# Patient Record
Sex: FEMALE | Race: OTHER | HISPANIC OR LATINO | URBAN - METROPOLITAN AREA
[De-identification: names, ages, dates, MRNs, and addresses within clinical notes are randomized per-mention and may not be internally consistent; named-entity substitution may affect disease eponyms.]

---

## 2017-11-09 ENCOUNTER — APPOINTMENT (RX ONLY)
Dept: URBAN - METROPOLITAN AREA CLINIC 169 | Facility: CLINIC | Age: 42
Setting detail: DERMATOLOGY
End: 2017-11-09

## 2017-11-09 DIAGNOSIS — L71.8 OTHER ROSACEA: ICD-10-CM

## 2017-11-09 PROCEDURE — 99212 OFFICE O/P EST SF 10 MIN: CPT

## 2017-11-09 PROCEDURE — ? COUNSELING

## 2017-11-09 PROCEDURE — ? PRESCRIPTION

## 2017-11-09 RX ORDER — ADAPALENE 3 MG/G
GEL TOPICAL
Qty: 1 | Refills: 10 | Status: ERX | COMMUNITY
Start: 2017-11-09

## 2017-11-09 RX ADMIN — ADAPALENE 1: 3 GEL TOPICAL at 00:00

## 2017-11-09 ASSESSMENT — LOCATION DETAILED DESCRIPTION DERM
LOCATION DETAILED: LEFT MEDIAL MALAR CHEEK
LOCATION DETAILED: LEFT INFERIOR CENTRAL MALAR CHEEK
LOCATION DETAILED: LEFT CENTRAL MALAR CHEEK
LOCATION DETAILED: RIGHT MEDIAL FOREHEAD
LOCATION DETAILED: RIGHT INFERIOR CENTRAL MALAR CHEEK

## 2017-11-09 ASSESSMENT — LOCATION SIMPLE DESCRIPTION DERM
LOCATION SIMPLE: RIGHT FOREHEAD
LOCATION SIMPLE: RIGHT CHEEK
LOCATION SIMPLE: LEFT CHEEK

## 2017-11-09 ASSESSMENT — LOCATION ZONE DERM: LOCATION ZONE: FACE

## 2022-01-11 ENCOUNTER — OFFICE VISIT (OUTPATIENT)
Dept: URBAN - METROPOLITAN AREA CLINIC 10 | Facility: CLINIC | Age: 47
End: 2022-01-11
Payer: COMMERCIAL

## 2022-01-11 DIAGNOSIS — H20.9 UVEITIS: Primary | ICD-10-CM

## 2022-01-11 DIAGNOSIS — H16.223 KERATOCONJUNCTIVITIS SICCA, BILATERAL: ICD-10-CM

## 2022-01-11 PROCEDURE — 92134 CPTRZ OPH DX IMG PST SGM RTA: CPT | Performed by: STUDENT IN AN ORGANIZED HEALTH CARE EDUCATION/TRAINING PROGRAM

## 2022-01-11 PROCEDURE — 99204 OFFICE O/P NEW MOD 45 MIN: CPT | Performed by: STUDENT IN AN ORGANIZED HEALTH CARE EDUCATION/TRAINING PROGRAM

## 2022-01-11 RX ORDER — PREDNISOLONE ACETATE 10 MG/ML
1 % SUSPENSION/ DROPS OPHTHALMIC
Qty: 10 | Refills: 1 | Status: INACTIVE
Start: 2022-01-11 | End: 2022-02-22

## 2022-01-11 ASSESSMENT — INTRAOCULAR PRESSURE
OD: 14
OS: 14

## 2022-01-11 ASSESSMENT — KERATOMETRY
OS: 42.25
OD: 42.63

## 2022-01-11 ASSESSMENT — VISUAL ACUITY
OS: 20/20
OD: 20/25

## 2022-01-11 NOTE — IMPRESSION/PLAN
Impression: Keratoconjunctivitis sicca, bilateral: V42.050. Plan: Patient educated on signs and symptoms of dry eye and importance of environmental factors. Discussed using OTC AT's with patient QID OU long-term, Gel drop QHS OU, and  warm compresses BID 5-10min OU long-term. Educated patient if signs or symptoms worsen to RTC for dry eye work-up. Start Pred Forte QID OU x 1 week, then TID x 1 week, then BID x 1 week, then QD x 1 week then d/c

RTC 4-6 weeks for dry eye follow up (consider Xiidra if improvement noted)

## 2022-01-27 ENCOUNTER — OFFICE VISIT (OUTPATIENT)
Dept: URBAN - METROPOLITAN AREA CLINIC 56 | Facility: CLINIC | Age: 47
End: 2022-01-27
Payer: COMMERCIAL

## 2022-01-27 DIAGNOSIS — H20.023 RECURRENT ACUTE IRIDOCYCLITIS, BILATERAL: Primary | ICD-10-CM

## 2022-01-27 PROCEDURE — 99214 OFFICE O/P EST MOD 30 MIN: CPT | Performed by: STUDENT IN AN ORGANIZED HEALTH CARE EDUCATION/TRAINING PROGRAM

## 2022-01-27 ASSESSMENT — INTRAOCULAR PRESSURE
OD: 13
OD: 14
OS: 14

## 2022-01-27 NOTE — IMPRESSION/PLAN
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 Plan: several instances over the past few years. Currently undergoing workups with rheum but no systemic treatment at this time. (+) KALE and (+) HLA-B27. Continue care with rheum as directed. Pt did not start pred as directed by Dr. Dipti Bruce last visit.  

Start pred QID OU on weekly taper

## 2022-02-22 ENCOUNTER — OFFICE VISIT (OUTPATIENT)
Dept: URBAN - METROPOLITAN AREA CLINIC 56 | Facility: CLINIC | Age: 47
End: 2022-02-22
Payer: COMMERCIAL

## 2022-02-22 DIAGNOSIS — H43.393 OTHER VITREOUS OPACITIES, BILATERAL: ICD-10-CM

## 2022-02-22 PROCEDURE — 99214 OFFICE O/P EST MOD 30 MIN: CPT | Performed by: OPTOMETRIST

## 2022-02-22 RX ORDER — PREDNISOLONE ACETATE 10 MG/ML
1 % SUSPENSION/ DROPS OPHTHALMIC
Qty: 10 | Refills: 1 | Status: ACTIVE
Start: 2022-02-22

## 2022-02-22 ASSESSMENT — INTRAOCULAR PRESSURE
OS: 14
OD: 14

## 2022-02-22 NOTE — IMPRESSION/PLAN
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
-worsening OS today
-(+) KALE and (+) HLA-B27 Plan: Increase Pred forte to QID OS for 2 weeks. Slower taper. May need systemic treatment if drops do not resolve iritis.

## 2022-02-22 NOTE — IMPRESSION/PLAN
Impression: Other vitreous opacities, bilateral: H43.393. Plan: S/S RD discussed. RTC if problem persists or worsens.

## 2023-01-23 NOTE — IMPRESSION/PLAN
Impression: Uveitis: H20.9. Plan: Hx of Chronic iritis, diagnosed with RA. Not active today. You can access the FollowMyHealth Patient Portal offered by [facility] by registering at the following website: [email or web address]. By joining [employer]’s FollowMyHealth portal, you will also be able to view your health information using other applications (apps) compatible with our system.